# Patient Record
Sex: FEMALE | Race: WHITE
[De-identification: names, ages, dates, MRNs, and addresses within clinical notes are randomized per-mention and may not be internally consistent; named-entity substitution may affect disease eponyms.]

---

## 2022-12-04 ENCOUNTER — HOSPITAL ENCOUNTER (OUTPATIENT)
Dept: HOSPITAL 97 - ER | Age: 77
Setting detail: OBSERVATION
LOS: 1 days | Discharge: HOME | End: 2022-12-05
Attending: HOSPITALIST | Admitting: HOSPITALIST
Payer: COMMERCIAL

## 2022-12-04 VITALS — OXYGEN SATURATION: 98 %

## 2022-12-04 VITALS — BODY MASS INDEX: 30.9 KG/M2

## 2022-12-04 DIAGNOSIS — G45.4: Primary | ICD-10-CM

## 2022-12-04 DIAGNOSIS — Z20.822: ICD-10-CM

## 2022-12-04 DIAGNOSIS — R42: ICD-10-CM

## 2022-12-04 DIAGNOSIS — F13.20: ICD-10-CM

## 2022-12-04 LAB
ALBUMIN SERPL BCP-MCNC: 3.6 G/DL (ref 3.4–5)
ALP SERPL-CCNC: 77 U/L (ref 45–117)
ALT SERPL W P-5'-P-CCNC: 40 U/L (ref 12–78)
AST SERPL W P-5'-P-CCNC: 21 U/L (ref 15–37)
BUN BLD-MCNC: 14 MG/DL (ref 7–18)
GLUCOSE SERPLBLD-MCNC: 174 MG/DL (ref 74–106)
HCT VFR BLD CALC: 40.4 % (ref 36–45)
INR BLD: 0.93
LYMPHOCYTES # SPEC AUTO: 1.8 K/UL (ref 0.7–4.9)
MAGNESIUM SERPL-MCNC: 1.6 MG/DL (ref 1.8–2.4)
MCV RBC: 91.3 FL (ref 80–100)
NT-PROBNP SERPL-MCNC: 42 PG/ML (ref ?–450)
PMV BLD: 7.3 FL (ref 7.6–11.3)
POTASSIUM SERPL-SCNC: 3.3 MMOL/L (ref 3.5–5.1)
RBC # BLD: 4.42 M/UL (ref 3.86–4.86)
TROPONIN I SERPL HS-MCNC: 8.4 PG/ML (ref ?–58.9)

## 2022-12-04 PROCEDURE — 81003 URINALYSIS AUTO W/O SCOPE: CPT

## 2022-12-04 PROCEDURE — 85730 THROMBOPLASTIN TIME PARTIAL: CPT

## 2022-12-04 PROCEDURE — 80053 COMPREHEN METABOLIC PANEL: CPT

## 2022-12-04 PROCEDURE — 85610 PROTHROMBIN TIME: CPT

## 2022-12-04 PROCEDURE — 85652 RBC SED RATE AUTOMATED: CPT

## 2022-12-04 PROCEDURE — 84484 ASSAY OF TROPONIN QUANT: CPT

## 2022-12-04 PROCEDURE — 83690 ASSAY OF LIPASE: CPT

## 2022-12-04 PROCEDURE — 96365 THER/PROPH/DIAG IV INF INIT: CPT

## 2022-12-04 PROCEDURE — 85025 COMPLETE CBC W/AUTO DIFF WBC: CPT

## 2022-12-04 PROCEDURE — 83735 ASSAY OF MAGNESIUM: CPT

## 2022-12-04 PROCEDURE — 87811 SARS-COV-2 COVID19 W/OPTIC: CPT

## 2022-12-04 PROCEDURE — 70450 CT HEAD/BRAIN W/O DYE: CPT

## 2022-12-04 PROCEDURE — 36415 COLL VENOUS BLD VENIPUNCTURE: CPT

## 2022-12-04 PROCEDURE — 83880 ASSAY OF NATRIURETIC PEPTIDE: CPT

## 2022-12-04 PROCEDURE — 82565 ASSAY OF CREATININE: CPT

## 2022-12-04 PROCEDURE — 99285 EMERGENCY DEPT VISIT HI MDM: CPT

## 2022-12-04 PROCEDURE — 80061 LIPID PANEL: CPT

## 2022-12-04 PROCEDURE — 70496 CT ANGIOGRAPHY HEAD: CPT

## 2022-12-04 PROCEDURE — 93880 EXTRACRANIAL BILAT STUDY: CPT

## 2022-12-04 PROCEDURE — 70498 CT ANGIOGRAPHY NECK: CPT

## 2022-12-04 PROCEDURE — 80048 BASIC METABOLIC PNL TOTAL CA: CPT

## 2022-12-04 PROCEDURE — 70551 MRI BRAIN STEM W/O DYE: CPT

## 2022-12-04 PROCEDURE — 86140 C-REACTIVE PROTEIN: CPT

## 2022-12-04 PROCEDURE — 96375 TX/PRO/DX INJ NEW DRUG ADDON: CPT

## 2022-12-04 PROCEDURE — 71045 X-RAY EXAM CHEST 1 VIEW: CPT

## 2022-12-04 PROCEDURE — 93306 TTE W/DOPPLER COMPLETE: CPT

## 2022-12-04 PROCEDURE — 93005 ELECTROCARDIOGRAM TRACING: CPT

## 2022-12-04 PROCEDURE — 80076 HEPATIC FUNCTION PANEL: CPT

## 2022-12-04 RX ADMIN — AMLODIPINE BESYLATE SCH MG: 2.5 TABLET ORAL at 22:07

## 2022-12-04 RX ADMIN — LOSARTAN POTASSIUM SCH MG: 50 TABLET, FILM COATED ORAL at 22:07

## 2022-12-04 RX ADMIN — Medication SCH ML: at 21:00

## 2022-12-04 NOTE — EDPHYS
Physician Documentation                                                                           

 [facility]                                                                 

Name: Monika Domingo                                                                               

Age: 77 yrs                                                                                       

Sex: Female                                                                                       

: 1945                                                                                   

MRN: [id number]                                                                                   

Arrival Date: 2022                                                                          

Time: 09:41                                                                                       

Account#: [id number]                                                                            

Bed 3                                                                                             

Private MD: Pierce Rayo T                                                                        

ED Physician Malcolm Mariscal                                                                      

HPI:                                                                                              

                                                                                             

12:27 This 77 yrs old Unknown Female presents to ER via Ambulatory with complaints of         ernie 

      Dizziness, High Blood Pressure.                                                             

12:27 The patient presents with dizziness, generalized weakness, lightheadedness, sense of    ernie 

      spinning. Onset: The symptoms/episode began/occurred 3 day(s) ago. Context: occurred.       

      Modifying factors: The symptoms are alleviated by nothing, the symptoms are aggravated      

      by standing up. Associated signs and symptoms: Pertinent positives: confusion. Severity     

      of symptoms: At their worst the symptoms were mild in the emergency department the          

      symptoms are unchanged. Patient's baseline: Neuro: alert and fully oriented. The            

      patient has experienced similar episodes in the past, a few times.                          

                                                                                                  

Historical:                                                                                       

- Allergies:                                                                                      

10:13 No Known Allergies;                                                                     kb3 

- PMHx:                                                                                           

10:13 Hypercholesterolemia; Hypertensive disorder; NIDDM; Bladder spasms; COPD;               kb3 

- PSHx:                                                                                           

10:13 Total abdominal hysterectomy; Cholecystectomy; Bilateral Knee Sx;                       kb3 

                                                                                                  

- Immunization history:: Adult Immunizations up to date, Client reports having NOT                

  received the Covid vaccine. Last tetanus immunization: unknown.                                 

- Social history:: Smoking status: Patient reports the use of cigarette tobacco                   

  products, denies chronic smoking, but will smoke occasionally.                                  

- Family history:: not pertinent.                                                                 

                                                                                                  

                                                                                                  

ROS:                                                                                              

12:27 Constitutional: Negative for fever, chills, and weight loss, Eyes: Negative for injury, ernie 

      pain, redness, and discharge, ENT: Negative for injury, pain, and discharge, Neck:          

      Negative for injury, pain, and swelling, Cardiovascular: Negative for chest pain,           

      palpitations, and edema, Respiratory: Negative for shortness of breath, cough,              

      wheezing, and pleuritic chest pain, Abdomen/GI: Negative for abdominal pain, nausea,        

      vomiting, diarrhea, and constipation, Back: Negative for injury and pain, : Negative      

      for injury, bleeding, discharge, and swelling, MS/Extremity: Negative for injury and        

      deformity, Skin: Negative for injury, rash, and discoloration, Psych: Negative for          

      depression, anxiety, suicide ideation, homicidal ideation, and hallucinations,              

      Allergy/Immunology: Negative for hives, rash, and allergies, Endocrine: Negative for        

      neck swelling, polydipsia, polyuria, polyphagia, and marked weight changes,                 

      Hematologic/Lymphatic: Negative for swollen nodes, abnormal bleeding, and unusual           

      bruising.                                                                                   

12:27 Neuro: Positive for altered mental status, dizziness, weakness.                             

                                                                                                  

Exam:                                                                                             

12:27 Constitutional:  This is a well developed, well nourished patient who is awake, alert,  ernie 

      and in no acute distress. Head/Face:  Normocephalic, atraumatic. Eyes:  Pupils equal        

      round and reactive to light, extra-ocular motions intact.  Lids and lashes normal.          

      Conjunctiva and sclera are non-icteric and not injected.  Cornea within normal limits.      

      Periorbital areas with no swelling, redness, or edema. ENT:  Nares patent. No nasal         

      discharge, no septal abnormalities noted.  Tympanic membranes are normal and external       

      auditory canals are clear.  Oropharynx with no redness, swelling, or masses, exudates,      

      or evidence of obstruction, uvula midline.  Mucous membranes moist. Neck:  Trachea          

      midline, no thyromegaly or masses palpated, and no cervical lymphadenopathy.  Supple,       

      full range of motion without nuchal rigidity, or vertebral point tenderness.  No            

      Meningismus. Chest/axilla:  Normal chest wall appearance and motion.  Nontender with no     

      deformity.  No lesions are appreciated. Cardiovascular:  Regular rate and rhythm with a     

      normal S1 and S2.  No gallops, murmurs, or rubs.  Normal PMI, no JVD.  No pulse             

      deficits. Respiratory:  Lungs have equal breath sounds bilaterally, clear to                

      auscultation and percussion.  No rales, rhonchi or wheezes noted.  No increased work of     

      breathing, no retractions or nasal flaring. Abdomen/GI:  Soft, non-tender, with normal      

      bowel sounds.  No distension or tympany.  No guarding or rebound.  No evidence of           

      tenderness throughout. Back:  No spinal tenderness.  No costovertebral tenderness.          

      Full range of motion. Female :  Normal external genitalia. Skin:  Warm, dry with          

      normal turgor.  Normal color with no rashes, no lesions, and no evidence of cellulitis.     

      MS/ Extremity:  Pulses equal, no cyanosis.  Neurovascular intact.  Full, normal range       

      of motion. Neuro:  Awake and alert, GCS 15, oriented to person, place, time, and            

      situation.  Cranial nerves II-XII grossly intact.  Motor strength 5/5 in all                

      extremities.  Sensory grossly intact.  Cerebellar exam normal.  Normal gait. Psych:         

      Awake, alert, with orientation to person, place and time.  Behavior, mood, and affect       

      are within normal limits.                                                                   

12:27 ECG was reviewed by the Attending Physician.                                                

                                                                                                  

Vital Signs:                                                                                      

10:07  / 110; Pulse 95; Resp 20; Temp 97.6; Pulse Ox 97% ; Weight 81.65 kg; Height 5    kb3 

      ft. 4 in. (162.56 cm); Pain 5/10;                                                           

10:43  / 95; Pulse 76; Resp 18; Pulse Ox 97% on R/A;                                    ld1 

11:54  / 86; Pulse 70; Resp 16; Pulse Ox 100% on R/A;                                   ph  

13:00  / 78; Pulse 72; Resp 18; Pulse Ox 98% on R/A;                                    ph  

14:00  / 89; Pulse 71; Resp 18; Pulse Ox 98% on R/A;                                    ph  

15:00  / 90; Pulse 72; Resp 16; Pulse Ox 99% ;                                          ph  

16:00  / 82; Pulse 71; Resp 18; Pulse Ox 98% on R/A;                                    ph  

19:00  / 81; Pulse 68; Resp 16; Pulse Ox 99% on R/A;                                    eh3 

20:00  / 80; Pulse 67; Resp 16; Pulse Ox 98% on R/A;                                    eh3 

10:07 Body Mass Index 30.90 (81.65 kg, 162.56 cm)                                             kb3 

                                                                                                  

MDM:                                                                                              

10:08 Patient medically screened.                                                             ernie 

12:29 Differential diagnosis: cardiac arrhythmia, CVA, generalized weakness, hypovolemia,     ernie 

      idiopathic dizziness, near-syncope, TIA, vertigo. Data reviewed: vital signs, nurses        

      notes, lab test result(s), EKG, radiologic studies, CT scan, plain films. Data              

      interpreted: Cardiac monitor: rate is 70 beats/min, rhythm is regular, Pulse oximetry:      

      on room air is 100 %. Test interpretation: by ED physician or midlevel provider: ECG,       

      plain radiologic studies. Counseling: I had a detailed discussion with the patient          

      and/or guardian regarding: the historical points, exam findings, and any diagnostic         

      results supporting the discharge/admit diagnosis, lab results, radiology results, the       

      need for further work-up and treatment in the hospital.                                     

12:33 ED course: FAMILY WOULD NOT RECEIVE TNK UNDER THESE FINDINGS.                           ernie 

                                                                                                  

                                                                                             

10:08 Order name: Basic Metabolic Panel; Complete Time: 12:08                                 ernie 

                                                                                             

10:08 Order name: CBC with Diff; Complete Time: 12:08                                         ernie 

                                                                                             

10:08 Order name: LFT's; Complete Time: 12:08                                                 ernie 

                                                                                             

10:08 Order name: Magnesium; Complete Time: 12:08                                             ernie 

                                                                                             

10:08 Order name: NT PRO-BNP; Complete Time: 12:08                                            ernie 

                                                                                             

10:08 Order name: PT-INR; Complete Time: 12:08                                                ernie 

                                                                                             

10:08 Order name: Troponin HS; Complete Time: 12:08                                           ernie 

                                                                                             

10:14 Order name: SARS RAPID; Complete Time: 12:08                                            ernie 

                                                                                             

10:14 Order name: Sed Rate; Complete Time: 12:08                                              ernie 

                                                                                             

10:14 Order name: CRP; Complete Time: 12:08                                                   ernie 

                                                                                             

10:35 Order name: Urine Dipstick-Ancillary; Complete Time: 12:08                              EDMS

                                                                                             

12:33 Order name: Lipase                                                                      [facility] 

                                                                                             

14:05 Order name: CBC with Automated Diff                                                     EDMS

                                                                                             

14:05 Order name: CBC with Automated Diff                                                     EDMS

                                                                                             

14:05 Order name: Comprehensive Metabolic Panel                                               EDMS

                                                                                             

14:05 Order name: Comprehensive Metabolic Panel                                               EDMS

                                                                                             

14:05 Order name: Lipid Profile                                                               EDMS

                                                                                             

14:05 Order name: Lipid Profile                                                               EDMS

                                                                                             

14:05 Order name: Magnesium                                                                   EDMS

                                                                                             

14:05 Order name: Magnesium                                                                   EDMS

                                                                                             

14:05 Order name: Protime (+INR)                                                              EDMS

                                                                                             

14:05 Order name: Protime (+INR)                                                              EDMS

                                                                                             

14:05 Order name: Protime (+INR)                                                              EDMS

                                                                                             

14:05 Order name: Protime (+INR)                                                              EDMS

                                                                                             

14:05 Order name: Protime (+INR)                                                              EDMS

                                                                                             

14:05 Order name: Protime (+INR)                                                              EDMS

                                                                                             

14:05 Order name: PTT, Activated Partial Thromb                                               EDMS

                                                                                             

14:05 Order name: PTT, Activated Partial Thromb                                               EDMS

                                                                                             

14:05 Order name: PTT, Activated Partial Thromb                                               EDMS

                                                                                             

14:05 Order name: PTT, Activated Partial Thromb                                               EDMS

                                                                                             

10:08 Order name: XRAY Chest (1 view); Complete Time: 12:26                                   [facility] 

                                                                                             

10:08 Order name: EKG; Complete Time: 10:09                                                   [facility] 

                                                                                             

10:08 Order name: Cardiac monitoring; Complete Time: 10:41                                    [facility] 

                                                                                             

10:08 Order name: EKG - Nurse/Tech; Complete Time: 10:41                                      [facility] 

                                                                                             

10:08 Order name: IV Saline Lock; Complete Time: 10:41                                        [facility] 

                                                                                             

10:08 Order name: Labs collected and sent; Complete Time: 10:41                               [facility] 

                                                                                             

10:08 Order name: O2 Per Protocol; Complete Time: 10:41                                       [facility] 

                                                                                             

10:08 Order name: O2 Sat Monitoring; Complete Time: 10:41                                     [facility] 

                                                                                             

10:08 Order name: Urine Dipstick-Ancillary (obtain specimen); Complete Time: 10:41            [facility] 

                                                                                             

10:14 Order name: CT Stroke Brain w/o Contrast; Complete Time: 12:08                          [facility] 

                                                                                             

10:14 Order name: CT Head Angio; Complete Time: 12:08                                         [facility] 

                                                                                             

10:14 Order name: CT Neck Angio                                                               [facility] 

                                                                                             

10:21 Order name: Neck Angio; Complete Time: 12:08                                            EDMS

                                                                                             

14:05 Order name: Physical Therapy Consult                                                    EDMS

                                                                                             

14:05 Order name: Echo with Doppler                                                           EDMS

                                                                                             

14:05 Order name: EKG Electrocardiogram                                                       EDMS

                                                                                             

14:05 Order name: PTT, Activated Partial Thromb                                               EDMS

                                                                                             

14:05 Order name: PTT, Activated Partial Thromb                                               EDMS

                                                                                             

14:05 Order name: Brain Wo Cont                                                               EDMS

                                                                                             

14:05 Order name: Carotid Artery Bilateral                                                    EDMS

                                                                                             

15:11 Order name: Misc. Order: restroom assistance; Complete Time: 19:48                      jmm 

                                                                                                  

EC:27 Rate is 76 beats/min. Rhythm is regular. QRS Axis is Normal. PA interval is normal. QRS ernie 

      interval is normal. QT interval is normal. No Q waves. T waves are Normal. No ST            

      changes noted. Clinical impression: NSR w/ Non-specific ST/T Changes and No evidence of     

      ischemia. Interpreted by me. Reviewed by me.                                                

                                                                                                  

Administered Medications:                                                                         

10:50 Drug: foLIC Acid 1 mg Route: IVPB; Site: right antecubital;                             ld1 

11:00 Follow up: Response: No adverse reaction; IV Status: Completed infusion                 ph  

11:36 Drug: Aspirin Chewable Tablet 162 mg Route: PO;                                         ld1 

20:01 Follow up: Response: No adverse reaction                                                ph  

13:37 Drug: Potassium Effervescent Tablet 25 mEq Route: PO;                                   ph  

20:01 Follow up: Response: No adverse reaction                                                ph  

13:37 Drug: Magnesium Sulfate 1 grams Route: IVPB; Infused Over: 1 hrs; Site: right           ph  

      antecubital;                                                                                

14:40 Follow up: Response: No adverse reaction; IV Status: Completed infusion                 ph  

13:37 Drug: Aspirin Chewable Tablet 162 mg Route: PO;                                         ph  

20:00 Follow up: Response: No adverse reaction                                                ph  

                                                                                                  

                                                                                                  

Disposition Summary:                                                                              

22 12:31                                                                                    

Hospitalization Ordered                                                                           

      Hospitalization Status: Observation                                                     ernie 

      Provider: Darinel Bills cha 

      Location: Telemetry/[facility] (observation)                                               ernie 

      Condition: Stable                                                                       ernie 

      Problem: new                                                                            ernie 

      Symptoms: have improved                                                                 ernie 

      Bed/Room Type: Standard                                                                 ernie 

      Room Assignment: 408(22 20:06)                                                    mw  

      Diagnosis                                                                                   

        - Altered mental status, unspecified                                                  ernie 

        - Dizziness and giddiness                                                             ernie 

        - Essential (primary) hypertension                                                    ernie 

        - Hypomagnesemia                                                                      ernie 

        - Hypokalemia                                                                         ernie 

        - Tobacco abuse counseling                                                            ernie 

        - Tobacco use                                                                         ernie 

      Forms:                                                                                      

        - Medication Reconciliation Form                                                      ernie 

        - SBAR form                                                                           ernie 

Signatures:                                                                                       

Dispatcher [employer]                           Mamta Sweet RN                        RN   Malcolm Manzo MD MD cha Mickail, Joel, PA PA jmm Hall, Patricia, RN                      RN                                                      

Emerita Mckeon RN                     RN   ld1                                                  

Peyton Murdock RN                    RN   kb3                                                  

                                                                                                  

Corrections: (The following items were deleted from the chart)                                    

20:06 12:31 ernie                                                                               mw  

                                                                                                  

**************************************************************************************************

## 2022-12-04 NOTE — RAD REPORT
EXAM DESCRIPTION:  RAD - Chest Single View - 12/4/2022 11:44 am

 

CLINICAL HISTORY:  COUGH, code stroke chest film

 

COMPARISON:  None

 

TECHNIQUE:  AP portable chest image was obtained 12/4/2022 11:44 am .

 

FINDINGS:  Lungs are clear. Heart and vasculature are normal. No measurable pleural effusion and no p
neumothorax. Hazy appearance to the hemidiaphragm and left costophrenic angle believed to be portable
 exam artifact. No acute bony abnormality seen. No acute aortic findings suspected.

 

IMPRESSION:  No acute cardiopulmonary process.

## 2022-12-04 NOTE — RAD REPORT
EXAM DESCRIPTION:  CT - Head angio - 12/4/2022 11:07 am

 

CLINICAL HISTORY:  Neuro deficit, acute, stroke suspected

 

TECHNIQUE:  During dynamic enhancement using nonionic IV contrast, axial 1 millimeter thick images of
 the head were obtained. Sagittal and axial reconstruction images were generated using MIP technique 
and reviewed.

 

All CT scans are performed using dose optimization technique as appropriate and may include automated
 exposure control or mA/KV adjustment according to patient size.

 

COMPARISON:   CT head same date, CT angio neck same date

 

FINDINGS:   No aneurysm or vascular malformation identified.

 

Major venous sinuses are patent.

 

No stenosis, named branch occlusion, vasculitis or other significant vascular finding identifiable. C
arotid atherosclerotic changes are present in the cavernous sinuses. No measurable luminal narrowing.


 

 

IMPRESSION:   Negative CT angio head examination for acute or significant finding.

## 2022-12-04 NOTE — RAD REPORT
EXAM DESCRIPTION:  CT - Ct Stroke Brain Wo Cont - 12/4/2022 10:23 am

 

CLINICAL HISTORY:  Neuro deficit, acute, stroke suspected, Stroke protocol examination

 

COMPARISON:  <Comparisons>none

 

TECHNIQUE:  Axial 5 millimeter thick images of the head were obtained without IV contrast.

 

All CT scans are performed using dose optimization technique as appropriate and may include automated
 exposure control or mA/KV adjustment according to patient size.

 

FINDINGS:  No intracranial hemorrhage, mass, or cerebral edema. No acute cortical based infarction se
en. No cortical edema or sulcal effacement. Patient has atrophy changes are minimal. Ventricles are n
ormal for the amount of volume loss. Chronic ischemic change seen in the cerebral white matter. No ex
tra-axial fluid collections.  Gray matter-white matter differentiation is preserved.Dense arterial tr
ee calcifications are present.

 

No globe or orbital content abnormality.

 

 

Visualized portions of the mastoid air cells, paranasal sinuses, and orbits are unremarkable.

 

Findings telephoned to Dr Mariscal 10:34 a.m..

 

IMPRESSION:  No hemorrhage. No acute CVA identifiable.

 

Patient has minimal atrophy and mild chronic ischemic change.

## 2022-12-04 NOTE — ER
Nurse's Notes                                                                                     

 [facility]                                                                 

Name: Monika Domingo                                                                               

Age: 77 yrs                                                                                       

Sex: Female                                                                                       

: 1945                                                                                   

MRN: [id number]                                                                                   

Arrival Date: 2022                                                                          

Time: 09:41                                                                                       

Account#: [id number]                                                                            

Bed 3                                                                                             

Private MD: Pierce Rayo T                                                                        

Diagnosis: Altered mental status, unspecified;Dizziness and giddiness;Essential (primary)         

  hypertension;Hypomagnesemia;Hypokalemia;Tobacco abuse counseling;Tobacco use                    

                                                                                                  

Presentation:                                                                                     

                                                                                             

10:07 Chief complaint: Patient states: Pt reports feeling "off' upon waking and slightly      kb3 

      dizzy. As she was driving to [employer], a  pulled her over and stated that       

      she was driving erratically. Pt's daughter was called to scene and pt reported feeling      

      dizzy with right posterior neck pain, no headache at this time but onset of                 

      intermittent headaches 1 week ago. Coronavirus screen: Vaccine status: Patient reports      

      being unvaccinated. Client denies travel out of the [place] in the last 14 days. Ebola         

      Screen: Patient negative for fever greater than or equal to 101.5 degrees Fahrenheit,       

      and additional compatible Ebola Virus Disease symptoms Patient denies exposure to           

      infectious person. Patient denies travel to an Ebola-affected area in the 21 days           

      before illness onset. Initial Sepsis Screen: Does the patient meet any 2 criteria? No.      

      Patient's initial sepsis screen is negative. Does the patient have a suspected source       

      of infection? No. Patient's initial sepsis screen is negative. Risk Assessment: Do you      

      want to hurt yourself or someone else? Patient reports no desire to harm self or            

      others. Onset of symptoms was 2022 at 07:00.                                   

10:07 Method Of Arrival: Ambulatory                                                           kb3 

10:07 Acuity: JEVON 2                                                                           kb3 

                                                                                                  

Triage Assessment:                                                                                

10:11 General: Appears in no apparent distress. Behavior is calm, cooperative, Code Stroke    kb3 

      activated.                                                                                  

10:11 Pain: Complains of pain in back of neck Pain does not radiate. Pain currently is 5 out  kb3 

      of 10 on a pain scale. Quality of pain is described as aching, Pain began 3 hours ago.      

                                                                                                  

Historical:                                                                                       

- Allergies:                                                                                      

10:13 No Known Allergies;                                                                     kb3 

- PMHx:                                                                                           

10:13 Hypercholesterolemia; Hypertensive disorder; NIDDM; Bladder spasms; COPD;               kb3 

- PSHx:                                                                                           

10:13 Total abdominal hysterectomy; Cholecystectomy; Bilateral Knee Sx;                       kb3 

                                                                                                  

- Immunization history:: Adult Immunizations up to date, Client reports having NOT                

  received the Covid vaccine. Last tetanus immunization: unknown.                                 

- Social history:: Smoking status: Patient reports the use of cigarette tobacco                   

  products, denies chronic smoking, but will smoke occasionally.                                  

- Family history:: not pertinent.                                                                 

                                                                                                  

                                                                                                  

Screenin:54 Abuse screen: Denies threats or abuse. Denies injuries from another. Nutritional        ph  

      screening: No deficits noted. Tuberculosis screening: No symptoms or risk factors           

      identified. Fall Risk None identified.                                                      

                                                                                                  

Assessment:                                                                                       

10:11 Reassessment: Code stroke called overhead.                                              ph  

10:30 General: Appears in no apparent distress. comfortable, well groomed, Behavior is calm,  ph  

      cooperative, appropriate for age, Denies fever, feeling ill. Pain: Denies pain. Neuro:      

      Level of Consciousness is awake, alert, obeys commands, Oriented to person, place,          

      time, situation,  are equal bilaterally Moves all extremities. Full function Gait      

      is steady, Speech is normal, Facial symmetry appears normal, Facial symmetry: tongue is     

      midline, Pupils are PERRLA, Intact Reports dizziness, since waking this morning.            

      Cardiovascular: Denies chest pain, Capillary refill < 3 seconds in bilateral fingers        

      Patient's skin is warm and dry. Respiratory: Airway is patent Respiratory effort is         

      even, unlabored. GI: No signs and/or symptoms were reported involving the                   

      gastrointestinal system. Derm: Skin is healthy with good turgor, Skin is pink, warm \T\     

      dry. Musculoskeletal: Circulation, motion, and sensation intact. Range of motion:           

      intact in all extremities.                                                                  

11:55 Reassessment: Patient appears in no apparent distress at this time. Patient and/or      ph  

      family updated on plan of care and expected duration. Pain level reassessed. Patient is     

      alert, oriented x 3, equal unlabored respirations, skin warm/dry/pink.                      

13:00 Reassessment: Patient appears in no apparent distress at this time. Patient and/or      ph  

      family updated on plan of care and expected duration. Pain level reassessed. Patient is     

      alert, oriented x 3, equal unlabored respirations, skin warm/dry/pink.                      

14:00 Reassessment: Patient appears in no apparent distress at this time. Patient and/or      ph  

      family updated on plan of care and expected duration. Pain level reassessed. Patient is     

      alert, oriented x 3, equal unlabored respirations, skin warm/dry/pink.                      

15:00 Reassessment: Patient appears in no apparent distress at this time. Patient and/or      ph  

      family updated on plan of care and expected duration. Pain level reassessed. Patient is     

      alert, oriented x 3, equal unlabored respirations, skin warm/dry/pink.                      

16:00 Reassessment: Patient appears in no apparent distress at this time. Patient and/or      ph  

      family updated on plan of care and expected duration. Pain level reassessed. Patient is     

      alert, oriented x 3, equal unlabored respirations, skin warm/dry/pink.                      

17:00 Reassessment: Patient appears in no apparent distress at this time. Patient and/or      ph  

      family updated on plan of care and expected duration. Pain level reassessed. Patient is     

      alert, oriented x 3, equal unlabored respirations, skin warm/dry/pink.                      

19:00 General: Appears in no apparent distress. comfortable, Behavior is calm, cooperative,   eh3 

      appropriate for age. Pain: Denies pain. Neuro: Level of Consciousness is awake, alert,      

      obeys commands, Oriented to person, place, time, situation. Cardiovascular: Capillary       

      refill < 3 seconds Patient's skin is warm and dry. Respiratory: Airway is patent            

      Respiratory effort is even, unlabored, Respiratory pattern is regular, symmetrical. GI:     

      No signs and/or symptoms were reported involving the gastrointestinal system. : No        

      signs and/or symptoms were reported regarding the genitourinary system. EENT: No signs      

      and/or symptoms were reported regarding the EENT system. Derm: No signs and/or symptoms     

      reported regarding the dermatologic system. Musculoskeletal: No signs and/or symptoms       

      reported regarding the musculoskeletal system.                                              

20:00 Reassessment: Patient appears in no apparent distress at this time. Patient and/or      eh3 

      family updated on plan of care and expected duration. Pain level reassessed. Patient is     

      alert, oriented x 3, equal unlabored respirations, skin warm/dry/pink.                      

20:15 Reassessment: Attempted to call report to 4th floor, nurse was busy and will call back. [facility] 

                                                                                                  

Vital Signs:                                                                                      

10:07  / 110; Pulse 95; Resp 20; Temp 97.6; Pulse Ox 97% ; Weight 81.65 kg; Height 5    kb3 

      ft. 4 in. (162.56 cm); Pain 5/10;                                                           

10:43  / 95; Pulse 76; Resp 18; Pulse Ox 97% on R/A;                                    ld1 

11:54  / 86; Pulse 70; Resp 16; Pulse Ox 100% on R/A;                                   ph  

13:00  / 78; Pulse 72; Resp 18; Pulse Ox 98% on R/A;                                    ph  

14:00  / 89; Pulse 71; Resp 18; Pulse Ox 98% on R/A;                                    ph  

15:00  / 90; Pulse 72; Resp 16; Pulse Ox 99% ;                                          ph  

16:00  / 82; Pulse 71; Resp 18; Pulse Ox 98% on R/A;                                    ph  

19:00  / 81; Pulse 68; Resp 16; Pulse Ox 99% on R/A;                                    eh3 

20:00  / 80; Pulse 67; Resp 16; Pulse Ox 98% on R/A;                                    eh3 

10:07 Body Mass Index 30.90 (81.65 kg, 162.56 cm)                                             kb3 

                                                                                                  

ED Course:                                                                                        

09:41 Patient arrived in ED.                                                                  rg4 

09:41 Pierce Rayo MD is Private Physician.                                                   rg4 

10:07 Malcolm Mariscal MD is Attending Physician.                                             ernie 

10:09 Emerita Mckeon, MADAY is Primary Nurse.                                                   ld1 

10:11 Arm band placed on right wrist. Patient placed in an exam room, on a stretcher.         kb3 

10:13 Triage completed.                                                                       kb3 

10:25 CT Stroke Brain w/o Contrast In Process Unspecified.                                    EDMS

10:40 SARS RAPID Sent.                                                                        ld1 

10:50 No provider procedures requiring assistance completed. Inserted saline lock: 20 gauge   ld1 

      in right antecubital area, using aseptic technique. Blood collected.                        

11:09 CT Head Angio In Process Unspecified.                                                   EDMS

11:09 Neck Angio In Process Unspecified.                                                      EDMS

11:46 XRAY Chest (1 view) In Process Unspecified.                                             EDMS

11:54 Patient has correct armband on for positive identification. Placed in gown. Bed in low  ph  

      position. Call light in reach. Side rails up X 1. Client placed on continuous cardiac       

      and pulse oximetry monitoring. NIBP monitoring applied.                                     

12:30 Darinel Bills MD is Hospitalizing Provider.                                           ernie 

13:37 Patient admitted, IV remains in place.                                                  ph  

19:27 Primary Nurse role handed off by Emerita Mckeon RN                                      

19:46 Karen Canales, RN is Primary Nurse.                                                        eh3 

                                                                                                  

Administered Medications:                                                                         

10:50 Drug: foLIC Acid 1 mg Route: IVPB; Site: right antecubital;                             ld1 

11:00 Follow up: Response: No adverse reaction; IV Status: Completed infusion                 ph  

11:36 Drug: Aspirin Chewable Tablet 162 mg Route: PO;                                         ld1 

20:01 Follow up: Response: No adverse reaction                                                ph  

13:37 Drug: Potassium Effervescent Tablet 25 mEq Route: PO;                                   ph  

20:01 Follow up: Response: No adverse reaction                                                ph  

13:37 Drug: Magnesium Sulfate 1 grams Route: IVPB; Infused Over: 1 hrs; Site: right           ph  

      antecubital;                                                                                

14:40 Follow up: Response: No adverse reaction; IV Status: Completed infusion                 ph  

13:37 Drug: Aspirin Chewable Tablet 162 mg Route: PO;                                         ph  

20:00 Follow up: Response: No adverse reaction                                                ph  

                                                                                                  

                                                                                                  

Medication:                                                                                       

11:54 VIS not applicable for this client.                                                     ph  

                                                                                                  

Outcome:                                                                                          

12:31 Decision to Hospitalize by Provider.                                                    ernie 

20:46 Admitted to Med/surg accompanied by nurse, via wheelchair, room 408, with chart, Report 3 

      called to  David                                                                           

20:46 Condition: stable                                                                           

20:46 Instructed on the need for admit.                                                           

20:46 Patient left the ED.                                                                    3 

                                                                                                  

Signatures:                                                                                       

Dispatcher [employer]                           EDMalcolm Lo MD MD cha Hall, Patricia, RN                      RN   Betzy Landaverde                                 rg4                                                  

Luh Haq                                                                              

Emerita Mckeon, RN                     RN   ld1                                                  

Karen Canales, MADAY NASASR   3                                                  

Peyton Murdock RN                    RN   kb3                                                  

                                                                                                  

**************************************************************************************************

## 2022-12-04 NOTE — XMS REPORT
Continuity of Care Document

                           Created on:2022



Patient:FRANTZ SALMON

Sex:Female

:1945

External Reference #:[id number]





Demographics







                          Address                   [address]



                                                    [place] [address]

 

                          Home Phone                [phone]

 

                          Work Phone                [phone]

 

                          Email Address             [email or web address]

 

                          Preferred Language        English

 

                          Marital Status            Unknown

 

                          Pentecostal Affiliation     Unknown

 

                          Race                      Unknown

 

                          Additional Race(s)        Unavailable



                                                    White

 

                          Ethnic Group              Not  or 









Author







                          Organization              [facility]

t

 

                          Address                   [address]



                                                    [place] [address]

 

                          Phone                     [phone]









Support







                Name            Relationship    Address         Phone

 

                CRUMRINE, CLARY Unavailable     [address]  [phone]



                                                [place] [address] 

 

                CRUMRINE, CLARY Unavailable     [address]  [phone]



                                                [place] [address] 

 

                CRUMRINE, CLARY DA              [address]  [phone]



                                                [place] [id number] 









Care Team Providers







                    Name                Role                Phone

 

                    Dilip Yap   Attending Clinician Unavailable

 

                    Herve  Attending Clinician Unavailable

 

                    Chiquita Singh Attending Clinician Unavailable

 

                    Pablo Carroll  Attending Clinician Unavailable

 

                    UNDEFINED           Admitting Clinician Unavailable

 

                    Physician, No Primary or Family Admitting Clinician Unavaila

ble

 

                    Herve  Admitting Clinician Unavailable

 

                    Chiquita Singh Admitting Clinician Unavailable

 

                    Dilip Yap   Admitting Clinician Unavailable

 

                    Pablo Carroll  Admitting Clinician Unavailable









Payers







           Payer Name Policy Type Policy Number Effective Date Expiration Date [employer] (MEDICARE            [id number] 2022            



           REPLACEMENT PPO)                       00:00:00              







Problems







       Condition Condition Condition Status Onset  Resolution Last   Treating Co

mments 

Source



       Name   Details Category        Date   Date   Treatment Clinician        



                                                 Date                 

 

       Body mass Body Mass Problem Active 2018                             [facility]

via



       index 30+ Index 30+               0-03                               Medi

loren



       - obesity - Obesity               00:00:                             



                                   00                                 







Allergies, Adverse Reactions, Alerts







       Allergy Allergy Status Severity Reaction(s) Onset  Inactive Treating Comm

ents 

Source



       Name   Type                        Date   Date   Clinician        

 

       No Known DA     Active U             2021                      [facility]



       Allergie                             0-18                        [place]



       s                                  00:00:                      d



                                          00                          [facility]

 

       No Known DA     Active U                                   [facility]



       Allergie                             7-12                        [place]



       s                                  00:00:                      [place]



                                          00                          [facility]

 

       No Known DA     Active U                                   [facility]



       Allergie                                                     Clear



       s                                  00:00:                      [place]



                                          00                          [facility]

 

       No Known DA     Active U             2014                      [facility]



       Allergie                             0-13                        [place]



       s                                  00:00:                      d



                                          00                          Medical



                                                                      Center







Social History







                Smoking Status  Start Date      Stop Date       Source

 

                Never Smoker                                    [facility]







Medications







       Ordered Filled Start  Stop   Current Ordering Indication Dosage Frequency

 Signature

                    Comments            Components          Source



     Medication Medication Date Date Medication? Clinician                (SIG) 

          



     Name Name                                                   

 

     amlodipine amlodipine           No                       amlodipine        

   [facility]



     2.5 mg 2.5 mg                                    2.5 mg           Medical



     tablet TAKE tablet TAKE                                    tablet          

 



     1 TABLET BY 1 TABLET BY                                    TAKE 1          

 



     MOUTH EVERY MOUTH EVERY                                    TABLET BY       

    



     DAY  DAY                                     MOUTH           



                                                  EVERY DAY           

 

     calcium calcium           No                       calcium           [facility]



     1200mg 1200mg                                    1200mg           Medical

 

     diclofenac diclofenac           No                       diclofenac        

   [facility]



     1 % topical 1 % topical                                    1 %            M

edical



     gel APPLY 4 gel APPLY 4                                    topical         

  



     GRAMS TO GRAMS TO                                    gel APPLY           



     THE  THE                                     4 GRAMS TO           



     AFFECTED AFFECTED                                    THE            



     AREA(S) BY AREA(S) BY                                    AFFECTED          

 



     TOPICAL TOPICAL                                    AREA(S) BY           



     ROUTE 4 ROUTE 4                                    TOPICAL           



     TIMES PER TIMES PER                                    ROUTE 4           



     DAY  DAY                                     TIMES PER           



                                                  DAY            

 

     escitalopra escitalopra           No                       escitalopr      

     [facility]



     m 10 mg m 10 mg                                    am 10 mg           Medic

al



     tablet TAKE tablet TAKE                                    tablet          

 



     1 TABLET BY 1 TABLET BY                                    TAKE 1          

 



     MOUTH EVERY MOUTH EVERY                                    TABLET BY       

    



     DAY  DAY                                     MOUTH           



                                                  EVERY DAY           

 

     levothyroxi levothyroxi           No                       levothyrox      

     [facility]



     ne 150 mcg ne 150 mcg                                    ine 150           

Medical



     tablet TAKE tablet TAKE                                    mcg tablet      

     



     1 TABLET BY 1 TABLET BY                                    TAKE 1          

 



     MOUTH EVERY MOUTH EVERY                                    TABLET BY       

    



     OTHER DAY OTHER DAY                                    MOUTH           



     ALT WITH ALT WITH                                    EVERY           



     SYNTHROID SYNTHROID                                    OTHER DAY           



     175MCG 175MCG                                    ALT WITH           



                                                  SYNTHROID           



                                                  175MCG           

 

     losartan losartan           No                       losartan           [facility]

[facility]



     100 mg 100 mg                                    100 mg           Medical



     tablet TAKE tablet TAKE                                    tablet          

 



     1 TABLET BY 1 TABLET BY                                    TAKE 1          

 



     MOUTH EVERY MOUTH EVERY                                    TABLET BY       

    



     DAY  DAY                                     MOUTH           



                                                  EVERY DAY           

 

     metformin metformin           No                       metformin           

[facility]



      mg  mg                                     mg           

Medical



     tablet,exte tablet,exte                                    tablet,ext      

     



     nded nded                                    ended           



     release 24 release 24                                    release 24        

   



     hr TAKE 2 hr TAKE 2                                    hr TAKE 2           



     TABLETS BY TABLETS BY                                    TABLETS BY        

   



     MOUTH EVERY MOUTH EVERY                                    MOUTH           



     DAY AT DAY AT                                    EVERY DAY           



     BEDTIME BEDTIME                                    AT BEDTIME           

 

     nitrofurant nitrofurant           No                       nitrofuran      

     [facility]



     oin  oin                                     toin           [facility]



     monohydrate monohydrate                                    monohydrat      

     



     /macrocryst /macrocryst                                    e/macrocry      

     



     als 100 mg als 100 mg                                    stals 100         

  



     capsule 1 capsule 1                                    mg capsule          

 



     tablet tablet                                    1 tablet           



     after after                                    after           



     cystoscopy, cystoscopy,                                    cystoscopy      

     



     second second                                    , second           



     tablet 12 tablet 12                                    tablet 12           



     hours later hours later                                    hours           



                                                  later           

 

     nystatin nystatin           No                       nystatin           [facility]

via



     100,000 100,000                                    100,000           Medica

l



     unit/gram unit/gram                                    unit/gram           



     topical topical                                    topical           



     cream APPLY cream APPLY                                    cream           



     1    1                                       APPLY 1           



     APPLICATION APPLICATION                                    APPLICATIO      

     



     EXTERNALLY EXTERNALLY                                    N              



     TWICE A DAY TWICE A DAY                                    EXTERNALLY      

     



     FOR 7 DAYS FOR 7 DAYS                                    TWICE A           



                                                  DAY FOR 7           



                                                  DAYS           

 

     Ocuvite Ocuvite           No                       Ocuvite           [facility]



                                                                 [facility]

 

     rosuvastati rosuvastati           No                       rosuvastat      

     [facility]



     n 10 mg n 10 mg                                    in 10 mg           Medic

al



     tablet TAKE tablet TAKE                                    tablet          

 



     1 TABLET BY 1 TABLET BY                                    TAKE 1          

 



     MOUTH EVERY MOUTH EVERY                                    TABLET BY       

    



     DAY  DAY                                     MOUTH           



                                                  EVERY DAY           

 

     Trelegy Trelegy           No                       Trelegy           [facility]



     Ellipta 100 Ellipta 100                                    Ellipta         

  Medical



     mcg-62.5 mcg-62.5                                    100            



     mcg-25 mcg mcg-25 mcg                                    mcg-62.5          

 



     powder for powder for                                    mcg-25 mcg        

   



     inhalation inhalation                                    powder for        

   



     INHALE 1 INHALE 1                                    inhalation           



     PUFF BY PUFF BY                                    INHALE 1           



     MOUTH EVERY MOUTH EVERY                                    PUFF BY         

  



     DAY  DAY                                     MOUTH           



                                                  EVERY DAY           

 

     trospium ER trospium ER           No             1capsul Q1D  trospium     

      [facility]



     60 mg 60 mg                          e(s)      ER 60 mg           Medical



     capsule,ext capsule,ext                                    capsule,ex      

     



     ended ended                                    tended           



     release 24 release 24                                    release 24        

   



     hr Take 1 hr Take 1                                    hr Take 1           



     capsule capsule                                    capsule           



     every day every day                                    every day           



     by oral by oral                                    by oral           



     route as route as                                    route as           



     directed directed                                    directed           



     for 90 for 90                                    for 90           



     days. days.                                    days.           

 

     Vitamin D3 Vitamin D3           No                       Vitamin D3        

   [facility]



                                                                 [facility]

 

     zolpidem 10 zolpidem 10           No                       zolpidem        

   [facility]



     mg tablet mg tablet                                    10 mg           Medi

loren



     TAKE 1 TAKE 1                                    tablet           



     TABLET BY TABLET BY                                    TAKE 1           



     MOUTH EVERY MOUTH EVERY                                    TABLET BY       

    



     DAY AT DAY AT                                    MOUTH           



     BEDTIME AS BEDTIME AS                                    EVERY DAY         

  



     NEEDED NEEDED                                    AT BEDTIME           



                                                  AS NEEDED           







Vital Signs







             Vital Name   Observation Time Observation Value Comments     Source

 

             BP Diastolic 2022 00:00:00 100 mm[Hg]                [facility]

anny

 

             Height       2022 00:00:00 64 [in_i]                 [facility]

anny

 

             BMI (Body Mass Index) 2022 00:00:00 30.9 kg/m2               

 [facility]

 

             BP Systolic  2022 00:00:00 169 mm[Hg]                [facility]

edical

 

             Body Weight  2022 00:00:00 180 [lb_av]               [facility]

edical







Procedures







                Procedure       Date / Time     Performing Clinician Source



                                Performed                       

 

                [id number]         2021-10-18 00:00:00 [email or web address]        [facility]

 

                Orthopedic - Knee 2021 00:00:00                 [facility]

ical



                Replacement                                     

 

                Orthopedic - Knee 2014 00:00:00                 [facility]

ical



                Replacement                                     

 

                Hysterectomy - Vaginal 1978 00:00:00                 [facility]

[facility]







Plan of Care







             Planned Activity Planned Date Details      Comments     Source

 

             Diagnostic Test Pending 2022 00:00:00 [facility]inalysis,            

   [facility]



                                       complete [code =              



                                       urinalysis,               



                                       complete]                 

 

             Diagnostic Test Pending 2022 00:00:00 culture, urine         

     [facility]



                                       [code = culture,              



                                       urine]                    

 

             Future Appointment 2023 13:00:00 Stacey Collier, [id number]          

    [facility]



                                       [address]; [address],              



                                       [place], [place]               



                                       [address]                







Encounters







        Start   End     Encounter Admission Attending Care    Care    Encounter 

Source



        Date/Time Date/Time Type    Type    Clinicians Facility Department ID   

   

 

        2021-10-18         Inpatient         ROSALINDA YapTO   [id number]

 [facility]



        09:15:00                         Dilip                 [id number]  [place]



                                                                        [facility]



                                                                        d[facility]



                                                                        Hospita



                                                                        l

 

        2021         Inpatient         ROSALINDA YapTO   [id number]

 [facility]



        15:00:00                         Dilip                 [id number]  [place]



                                                                        [facility]



                                                                        dic



                                                                        Hospita



                                                                        l

 

        2021         Inpatient EL      ROSALINDA Yap   [facility]    [id number]

 [facility]



        13:00:00                         Dilip                 [id number]  [place]



                                                                        [facility]



                                                                        dic



                                                                        Hospita



                                                                        l

 

        2022 Outpatient         ELIZABETH PRIV    PRIV    151

[id number] [facility]



        00:00:00 00:00:00                 _Venice                 [id number] [facility]

l

 

        2022 Ginger                  PRIV    [facility]



        00:00:00 00:00:00 [facility]         Medic

al



                        ELIZABETH Millard         



                        MD: [address]                         _[address],                         Office*         



                        [address]                                           



                        [address],                                           



                        [place], [place]                                              



                        [address]                                         



                        , Ph.                                           



                        [phone]                                         

 

        2022 Outpatient         [facility] PRIV    PRIV    151

[id number] [facility]



        00:00:00 00:00:00                 _Venice                 [id number] Med[facility]

l

 

        2022-10-31 2022-10-31 Outpatient         [facility] PRIV    PRIV    151

[id number] [facility]



        00:00:00 00:00:00                 _Venice                 [id number] [facility]

l

 

        2022 Outpatient ANAND Singh [facility]   VALE    [id number]

[id number] [facility]



        08:00:00 08:00:00                 Chiquitaolayinka Hernandez      [facility]

 

        2021-10-18 2021-10-19 Inpatient ANAND Yap [facility]   SURG    [id number]

[id number] [facility]



        08:55:00 13:16:00                 Dilip                 85      [place]



                                                                        [facility]



                                                                        dic



                                                                        Hospita



                                                                        l

 

        2021 Outpatient ANAND Yap [facility]   3DAY    [id number]

[id number] [facility]



        09:00:00 23:00:00                 Dilip                 90      [place]



                                                                        [facility]



                                                                        dic



                                                                        Hospita



                                                                        l

 

        2021 Outpatient         Fracisco [facility]   LABO    [id number]

7969 [facility]



        18:17:00 18:17:00                 Dilip                 16      [facility]

 

        2021 Outpatient         Fracisco [facility]   REFE    [id number]

[id number] [facility]



        18:07:00 18:07:00                 Dilip                 49      [facility]

 

        2021 Outpatient EL      Elkouskyrie, [facility]   DAYS    [id number]

6-20 [facility]



        05:41:00 05:41:00                 Pablo                 [id number]  [place]



                                                                        [facility]



                                                                        dic



                                                                        Hospita



                                                                        l

 

        2021 Outpatient         Anandcjkyrie, [facility]   LABO    [id number]

[id number] [facility]



        18:16:00 18:16:00                 Pablo                 82      [facility]

 

        2021 Inpatient EL      Eljuan francisco, [facility]   SURG    [id number]

-20 [facility]



        14:40:00 14:40:00                 Pablo                 [id number]  [place]



                                                                        [facility]



                                                                        dic



                                                                        Hospita



                                                                        l

 

        2021 Outpatient RENEE [place]    [id number]

[id number] [facility]



        12:00:00 12:00:00                 Chiquita Mallory      [facility]

 

        2020 Outpatient RENEE [place]    [id number]

[id number] [facility]



        12:00:00 12:00:00                 Chiquita Kee      [facility]

 

        2018-10-12 2018-10-12 Outpatient         [id number]    PRIV    151

[id number] [facility]



        00:00:00 00:00:00                 _Cathey                 [id number] [facility]

l







Results







           Test Description Test Time  Test Comments Results    Result Comments 

Source









                    GLUBED              2021-10-19 08:28:00 









                      Test Item  Value      Reference Range Interpretation Comme

nts









             GLUBED (test code = GLUBED) 237 mg/dL           H            



BASIC METABOLIC PANEL2021-10-19 06:49:00





             Test Item    Value        Reference Range Interpretation Comments

 

             SODIUM (test code = 141 mmol/L   136-145      N            



             NA)                                                 

 

             POTASSIUM (test code = 4.9 mmol/L   3.5-5.1      N            



             K)                                                  

 

             CHLORIDE (test code = 104.0 mmol/L        N            



             CL)                                                 

 

             CARBON DIOXIDE (test 30.1 mmol/L  21-32        N            



             code = CO2)                                         

 

             GLUCOSE (test code = 174 mg/dL           H            



             GLU)                                                

 

             BLOOD UREA NITROGEN 19 mg/dL     7-18         H            



             (test code = BUN)                                        

 

             GLOMERULAR FILTRATION 74.9         >60                       Unit o

f measure:



             RATE (test code = GFR)                                        mL/mi

n/1.73



                                                                 [id number]



                                                                 Range:Healthy



                                                                 Adults >90



                                                                 mL/min/1.73 m2 

For



                                                                 Chronic Kidney



                                                                 Disease: Stage 

II



                                                                 Mild Decrease i

n



                                                                 GFR 60-90 Stage

 III



                                                                 Moderate Decrea

se



                                                                 in GFR 30-59 St

age



                                                                 IV Severe Decre

ase



                                                                 in GFR 15-29 St

age



                                                                 V Kidney Failur

e



                                                                 <15

 

             CREATININE (test code 0.75 mg/dL   0.55-1.30    N            



             = CREAT)                                            

 

             CALCIUM (test code = 8.2 mg/dL    8.2-10.1     N            



             CA)                                                 



HGB HCT2021-10-19 06:01:00





             Test Item    Value        Reference Range Interpretation Comments

 

             HEMOGLOBIN (test code = HGB) 11.3 g/dL    12-16        L           

 

 

             HEMATOCRIT (test code = HCT) 34.0 %       37-47        L           

 



SPECIMEN COMMENT: POD #1GLUBED2021-10-19 05:47:00





             Test Item    Value        Reference Range Interpretation Comments

 

             GLUBED (test code = GLUBED) 158 mg/dL           H            



GLUBED2021-10-18 20:08:00





             Test Item    Value        Reference Range Interpretation Comments

 

             GLUBED (test code = GLUBED) 232 mg/dL           H            



GLUBED2021-10-18 11:19:00





             Test Item    Value        Reference Range Interpretation Comments

 

             GLUBED (test code = GLUBED) 152 mg/dL           H            



GLUBED2021-10-18 09:24:00





             Test Item    Value        Reference Range Interpretation Comments

 

             GLUBED (test code = GLUBED) 149 mg/dL           H            



GLYCOSYLATED HEMOGLOBIN (HA1C)2021 20:30:00





             Test Item    Value        Reference Range Interpretation Comments

 

             GLYCOSYLATED 7.3 %        4.8-5.9      H            Any condition t

hat shortens



             HEMOGLOBIN (HA1C)                                        erythocyte

 survival or



             (test code = GLYHGB)                                        decreas

esmean erythrocyte



                                                                 age (e.g., jalyn

very from



                                                                 acute blood los

s,hemolytic



                                                                 anemai) will fa

lsely lower



                                                                 HGBA1c resultsr

egardless of



                                                                 the method used

. HGBA1c



                                                                 results frompat

ients with



                                                                 HbSS, HbCC and 

HbSc must be



                                                                 interpreted wit

hcaution



                                                                 given the patho

logical



                                                                 processes, incl

uding



                                                                 anemia,increase

d red cell



                                                                 turnover, trans

fusion



                                                                 requirements, t

hatadversely



                                                                 impact HGBA1c a

s a marker



                                                                 of long-term



                                                                 glycemiccontrol

.



                                                                 Alternative for

ms of



                                                                 testing such as



                                                                 fructosaminesho

uld be



                                                                 considered for 

these



                                                                 patients.Any co

ndition that



                                                                 shortens erytho

cyte



                                                                 survival or dec

reasesmean



                                                                 erythrocyte age

 (e.g.,



                                                                 recovery from a

cute blood



                                                                 loss,hemolytic 

anemia) will



                                                                 falsely lower H

GBA1c



                                                                 resultsregardle

ss of the



                                                                 method used. HG

BA1c results



                                                                 from patientswi

th HbSS,



                                                                 HbCC, and HbSc 

must be



                                                                 interpreted wit

h



                                                                 cautiongiven th

e



                                                                 pathological pr

ocesses,



                                                                 including anemi

a,increased



                                                                 red cell turnov

er,



                                                                 transfusion req

uirements,



                                                                 thatadversely i

mpact HGBA1c



                                                                 as a marker of 

long-term



                                                                 glycemiccontrol

.



                                                                 Alternative for

ms of



                                                                 testing such as



                                                                 fructosaminesho

uld be



                                                                 considered for 

these



                                                                 patients.DONE A

T: [facility]



                                                                 [id number]



                                                                 [place], 

[address]



GLYCOSYLATED HEMOGLOBIN (HA1C)2021 20:30:00





             Test Item    Value        Reference Range Interpretation Comments

 

             GLYCOSYLATED 7.3 %        4.8-5.9      H            Any condition t

hat shortens



             HEMOGLOBIN (HA1C)                                        erythocyte

 survival or



             (test code = GLYHGB)                                        decreas

esmean erythrocyte



                                                                 age (e.g., jalyn

very from



                                                                 acute blood los

s,hemolytic



                                                                 anemia) will fa

lsely lower



                                                                 HGBA1c resultsr

egardless of



                                                                 the method used

. HGBA1c



                                                                 results from [facility]



                                                                 HbSS, HbCC, and

 HbSc must



                                                                 be interpreted 

with



                                                                 cautiongiven th

e



                                                                 pathological pr

ocesses,



                                                                 including anemi

a,increased



                                                                 red cell turnov

er,



                                                                 transfusion req

uirements,



                                                                 thatadversely i

mpact HGBA1c



                                                                 as a marker of 

long-term



                                                                 glycemiccontrol

.



                                                                 Alternative for

ms of



                                                                 testing such as



                                                                 fructosaminesho

uld be



                                                                 considered for 

these



                                                                 patients.



PROTHROMBIN [id number] 20:04:00





             Test Item    Value        Reference Range Interpretation Comments

 

             PROTHROMBIN TIME 10.3 secs    10.1-12.5    N            



             PATIENT (test code =                                        



             PTP)                                                

 

             INTERNATIONAL NORMAL 0.90         <2.0                      RECOMME

NDED THERAPEUTIC



             RATIO (test code =                                        RANGE FOR

 ORAL



             INR)                                                ANTICOAGULANTTR

EATMENT:



                                                                 CONDITION INRPr

ophylaxis



                                                                 of venous throm

bosis in



                                                                 2.0 - 3.0 high-

risk



                                                                 medical or surg

ical



                                                                 patientsTreatme

nt of



                                                                 venous thrombos

is 2.0 -



                                                                 3.0Prevention o

f



                                                                 embolism 2.0 -



                                                                 3.0Prevention o

f



                                                                 recurrent embol

ism, or



                                                                 3.0 - 4.5 patie

nts with



                                                                 mechanical pros

thetic



                                                                 intravascular v

guy



IS PATIENT ON ANTICOAGULANTS ? NHas Lab been notified if Patient is on Heparin 
Drip? NOIf Yes, orderCBC, OCCULT BLOOD, PT every other day NTHROMBOPLASTIN TIME 
[id number] 20:04:00





             Test Item    Value        Reference Range Interpretation Comments

 

             PTT ACTIVATED (test code = APTT) 28.2 secs    24.9-37.0    N       

     



IS PATIENT ON ANTICOAGULANTS ? NHas Lab been notified if Patient is on Heparin 
Drip? NOIf Yes, orderCBC, OCCULT BLOOD, PT every other day NCOMPREHENSIVE 
METABOLIC [id number] 20:00:00





             Test Item    Value        Reference Range Interpretation Comments

 

             SODIUM (test code = 143 mmol/L   136-145      N            



             NA)                                                 

 

             POTASSIUM (test code = 4.1 mmol/L   3.5-5.1      N            



             K)                                                  

 

             CHLORIDE (test code = 103.0 mmol/L        N            



             CL)                                                 

 

             CARBON DIOXIDE (test 29.1 mmol/L  21-32        N            



             code = CO2)                                         

 

             GLUCOSE (test code = 153 mg/dL           H            



             GLU)                                                

 

             BLOOD UREA NITROGEN 19 mg/dL     7-18         H            



             (test code = BUN)                                        

 

             GLOMERULAR FILTRATION 86.8         >60                       Unit o

f measure:



             RATE (test code = GFR)                                        mL/mi

n/1.73



                                                                 [id number]



                                                                 Range:Healthy



                                                                 Adults >90



                                                                 mL/min/1.73 m2 

For



                                                                 Chronic Kidney



                                                                 Disease: Stage 

II



                                                                 Mild Decrease i

n



                                                                 GFR 60-90 Stage

 III



                                                                 Moderate Decrea

se



                                                                 in GFR 30-59 St

age



                                                                 IV Severe Decre

ase



                                                                 in GFR 15-29 St

age



                                                                 V Kidney Failur

e



                                                                 <15

 

             CREATININE (test code 0.66 mg/dL   0.55-1.30    N            



             = CREAT)                                            

 

             TOTAL PROTEIN (test 6.6 g/dL     6.4-8.2      N            



             code = PROT)                                        

 

             ALBUMIN (test code = 3.6 g/dL     3.4-5.0      N            



             ALB)                                                

 

             GLOBULIN (test code = 3.0 g/dL     2.2-4.2      N            



             GLOB)                                               

 

             ALBUMIN/GLOBULIN RATIO 1.2          0.7-2.0      N            



             (test code = A/G)                                        

 

             CALCIUM (test code = 8.9 mg/dL    8.2-10.1     N            



             CA)                                                 

 

             BILIRUBIN TOTAL (test 0.20 mg/dL   0.2-1.00     N            



             code = BILT)                                        

 

             SGOT/AST (test code = 20.0 U/L     15-37        N            



             AST)                                                

 

             SGPT/ALT (test code = 37.0 U/L     12-78        N            Please

 note new



             ALT)                                                normal range.

 

             ALKALINE PHOSPHATASE 65 U/L              N            



             TOTAL (test code =                                        



             ALKP)                                               



CBC W/AUTO [id number] 19:48:00





             Test Item    Value        Reference Range Interpretation Comments

 

             WHITE BLOOD CELL (test code = WBC) 8.3 K/mm3    5.8-11.0     N     

       

 

             RED BLOOD CELL (test code = RBC) 4.44 M/mm3   4.2-5.4      N       

     

 

             HEMOGLOBIN (test code = HGB) 13.9 g/dL    12-16        N           

 

 

             HEMATOCRIT (test code = HCT) 41.9 %       37-47        N           

 

 

             MEAN CELL VOLUME (test code = MCV) 94 fL        80-98        N     

       

 

             MEAN CELL HGB (test code = MCH) 31.3 pg      27-34        N        

    

 

             MEAN CELL HGB CONCENTRATION (test 33.2 g/dL    30.8-34.1    N      

      



             code = MCHC)                                        

 

             RED CELL DISTRIBUTION WIDTH (test 13.6 %       11-16        N      

      



             code = RDW)                                         

 

             PLT (test code = PLT) 348 K/mm3    130-400      N            

 

             MEAN PLATELET VOLUME (test code = 10.1 fL      8.9-12.1     N      

      



             MPV)                                                

 

             NEUTROPHIL % (test code = NT%) 63.0 %       45-70        N         

   

 

             LYMPHOCYTE % (test code = LY%) 25.3 %       20-40        N         

   

 

             MONOCYTE % (test code = MO%) 8.7 %        3-10         N           

 

 

             EOSINOPHIL % (test code = EO%) 2.2 %        1-5          N         

   

 

             BASOPHIL % (test code = BA%) 0.7 %        0.0-1.1      N           

 

 

             NEUTROPHIL # (test code = NT#) 5.20 K/mm3   2.00-7.50    N         

   

 

             LYMPHOCYTE # (test code = LY#) 2.09 K/mm3   1.50-4.00    N         

   

 

             MONOCYTE # (test code = MO#) 0.72 K/mm3   0.2-0.8      N           

 

 

             EOSINOPHIL # (test code = EO#) 0.18 K/mm3   0.04-0.4     N         

   

 

             BASOPHIL # (test code = BA#) 0.06 K/mm3   0.02-0.10    N           

 

 

             MANUAL DIFF REQUIRED (test code = NO           MANUAL DIFF         

      



             MDIFF)                                              

 

             NUCLEATED RED BLOOD CELL (test 0 %          0-0          N         

   



             code = NRBC)                                        



[id number] 10:46:00





             Test Item    Value        Reference Range Interpretation Comments

 

             GLUBED (test code = GLUBED) 121 mg/dL           N            



Novel Coronavirus  [id number] 10:07:00





             Test Item    Value        Reference Range Interpretation Comments

 

             Novel Coronavirus Negative     Negative                  Positive r

esults are



             2019 Inhouse (test                                        indicativ

e of the presence



             code = COVNONPUI)                                        ofSARS-CoV

-2 RNA, clinical



                                                                 correlation wit

h patient



                                                                 historyand othe

r



                                                                 diagnostic info

rmation is



                                                                 necessary to



                                                                 determinepatien

t infection



                                                                 status. Positiv

e results



                                                                 do not rule out

bacterial



                                                                 infection or co

-infection



                                                                 with other viru

ses.



                                                                 Negative result

s do not



                                                                 preclude SARS-C

oV-2



                                                                 infection andsh

ould not be



                                                                 used as the sol

e basis for



                                                                 patient



                                                                 managementdecis

ions.



                                                                 Negative result

s must be



                                                                 combined with



                                                                 otherclinical



                                                                 observations, p

atient



                                                                 history, and



                                                                 epidemiological

information



                                                                 . Detection of 

SARS-CoV-2



                                                                 RNA may be affe

cted



                                                                 bysample collec

tion



                                                                 methods, storag

e



                                                                 conditions, and

/or stageof



                                                                 infection. Clemencia

l RNA



                                                                 mutations, vacc

inations,



                                                                 antiviraltherap

eutics,



                                                                 antibiotics,



                                                                 chemotherapeuti

c



                                                                 orimmunosuppres

catracho drugs



                                                                 have not been e

valuated



                                                                 for effectson d

etection.



                                                                 Results are for

 the



                                                                 identification 

of



                                                                 SARS-CoV-2 RNA 

usingthe



                                                                 import.io  Sy

stem under



                                                                 the FDA Emergen

cy



                                                                 UseAuthorizatio

n. The



                                                                 testing is perf

ormed by



                                                                 personneltraine

d in the



                                                                 procedures for 

the [employer]



                                                                  molecular

diagnostic



                                                                 SARS-CoV-2 assa

y in vitro.



Novel Coronavirus  [id number] 10:07:00





             Test Item    Value        Reference Range Interpretation Comments

 

             Novel Coronavirus Negative     Negative                  Positive r

esults are



             2019 Inhouse (test                                        indicativ

e of the presence



             code = COVNONPUI)                                        ofSARS-CoV

-2 RNA, clinical



                                                                 correlation wit

h patient



                                                                 historyand othe

r



                                                                 diagnostic info

rmation is



                                                                 necessary to



                                                                 determinepatien

t infection



                                                                 status. Positiv

e results



                                                                 do not rule out

bacterial



                                                                 infection or co

-infection



                                                                 with other viru

ses.



                                                                 Negative result

s do not



                                                                 preclude SARS-C

oV-2



                                                                 infection andsh

ould not be



                                                                 used as the sol

e basis for



                                                                 patient



                                                                 managementdecis

ions.



                                                                 Negative result

s must be



                                                                 combined with



                                                                 otherclinical



                                                                 observations, p

atient



                                                                 history, and



                                                                 epidemiological

information



                                                                 . Detection of 

SARS-CoV-2



                                                                 RNA may be affe

cted



                                                                 bysample collec

tion



                                                                 methods, storag

e



                                                                 conditions, and

/or stageof



                                                                 infection. Clemencia

l RNA



                                                                 mutations, vacc

inations,



                                                                 antiviraltherap

eutics,



                                                                 antibiotics,



                                                                 chemotherapeuti

c



                                                                 orimmunosuppres

catracho drugs



                                                                 have not been e

valuated



                                                                 for effectson d

etection.



                                                                 Results are for

 the



                                                                 identification 

of



                                                                 SARS-CoV-2 RNA 

usingthe



                                                                 [place]

stem under



                                                                 the FDA Emergen

cy



                                                                 UseAuthorizatio

n. The



                                                                 testing is perf

ormed by



                                                                 personneltraine

d in the



                                                                 procedures for 

the [employer]



                                                                  molecular

diagnostic



                                                                 SARS-CoV-2 kip mittal in vitro.



- MRI LW JNT W/O CONT [id number] 15:34:00
********************************************************[facility] : 1945  Sex: 
F************************************************************Patient Name: 
FRANTZ SALMON Unit No: [id number] EXAMS:  CPT CODE: [id number] MRI LW JNT
W/[place] [address] MRI OF THE RIGHT KNEE DIAGNOSIS: 1. Horizontal tear through 
the tibial articular surfaceof the body and posterior horn of the medial 
meniscus with extension into the posterior root and partial extrusion of the 
body of the meniscus. 2. Horizontal tear of the anterior horn of the lateral men
iscus near the meniscal root. 3. Chondromalacia of the patella with partial-
thickness cartilage lossand subchondral cyst formation. A moderate to large 
joint effusion is present without evidence for aloose body. COMMENT: COMPARISON:
No prior exams available. Scans were performed in the sagittal, axial and 
coronal planes utilizing T1, spin density with fat saturation and T2-weighted 
pulse sequences.Bony and hyaline cartilage abnormalities are present as 
described. The body and posterior horn of the medial meniscus and the anterior 
horn the lateral meniscus are torn. The anterior horn of the medial meniscus and
the posterior horn of the lateral meniscus are within normal limits in 
appearance. No abnormality is seen involving the anterior or posterior cruciate 
or medial or lateral collateral  ligaments. The quadriceps and patellar tendons 
are intact. ** Electronically Signed by Kody Higuera MD ** ** on 2021 
at 1534 **  Reported and signed by: Kody Higuera MD CC: Dilip Yap MD
 Technologist: Ermelinda Huddleston RT(R) Transcribed D/T: 2021 () Kevin.JCL 
[facility] NAME: FRANTZ SALMON [address] [facility] PHYS: 
Dilip Patel : 1945 AGE: 75 SEX: F [place] [address] ACCT NO: [id number] LOC: Y.MRI PHONE #: [phone] EXAM DATE:
2021 STATUS: REG CLI FAX #: [phone]  RAD #: D/C DT PAGE 1 Signed 
Report Patient Name: FRANTZ SALMON Unit No: [id number]  EXAMS: CPT CODE:
[id number] MRI LW JNT W/[place] [address] (Continued) Orig Print D/T: S: 2021
(1538) [facility] NAME: FRANTZ SALMON [address] [facility] 
PHYS: Dilip Patel : 1945 AGE: 75 SEX: F [place] [address] ACCT NO: [id number] LOC: Y.MRI PHONE #: [phone] EXAM DATE: 
2021 STATUS: REG CLI FAX #: [phone] RAD #: D/C DT PAGE 2  Signed 
Report- MRI UP JNT W/O CONT [id number] 14:11:00 Patient Name: FRANTZ SALMON Unit No: [id number] EXAMS: CPT CODE: [id number] MRI UP JNT W/O CONT RT  
[address] MR of the right shoulder without contrast TECHNIQUE: Paracoronal, 
parasagittal and axial multisequence images obtained.  COMPARISON: None 
available. FINDINGS: Acromioclavicular joint: Moderate degenerative changes. 
Rotator cuff: High-grade tear of the anterior supraspinatus insertion measures 1
cm transversely, likely reaching full-thickness. Minimal tendon retraction is 
present. There is tendinosis and interstitial tear of the posterior 
supraspinatus and anterior infraspinatus tendons.Moderate atrophy of the teres 
minor muscle. Long head biceps tendon: The biceps tendon is medially subluxated 
with superimposed tendinosis. Labrum: No evidence of labral tear. Cartilage/ 
bone: No full thickness chondral defect. No acute fracture. No suspicious 
osseous lesion.  Other: No joint effusionpresent. IMPRESSION: 1. High-grade tear
the anterior supraspinatus tendon, likely full-thickness with minimal tendon 
retraction. 2. Moderate teres minor muscle atrophy. 3. Subluxation and 
tendinosis ofthe long head of the biceps tendon. ** Electronically Signed by 
DEBBY Slade ** ** on 2019 at 1411 ** Reported and signed by: 
Collin Slade M.D. CC: Oscar Mccauley MD  Technologist: WILIAM Garcia(R) Transcribed D/T: 2019 (8758) [facility] NAME: FRANTZ SALMON [address] [facility] PHYS: Oscar Cartagena : 1945 AGE: 74 SEX: F [place] [address] ACCT 
NO: [id number] LOC: Y.MRI PHONE #: [phone] EXAM DATE: 2019 STATUS: 
DEP CLI FAX #: [phone] RAD #: D/C DT PAGE 1 Signed Report Patient Name: FRANTZ TORO Unit No: [id number] EXAMS: CPT CODE: [id number] MRI UP JNT W/O
CONT RT [address]  (Continued) Orig Print D/T: S: 2019 (1414) [facility] NAME: FRANTZ SALMON[address] [facility] PHYS: Oscar Cartagena : 1945 AGE: 74  SEX: F [place] [address] ACCT
NO: [id number] LOC: Y.MRI PHONE #: [phone] EXAM DATE: 2019 STATUS: 
DEP CLI FAX #: [phone]  RAD #: D/C DT PAGE 2 Signed Report

## 2022-12-04 NOTE — RAD REPORT
EXAM DESCRIPTION:  US - CP - 12/4/2022 2:38 pm

 

CLINICAL HISTORY:  cva

 

COMPARISON:  Neck Angio dated 12/4/2022

 

TECHNIQUE:  Real-time sonographic evaluation of bilateral carotid and vertebral systems was performed
. Gray scale and Doppler interrogation were performed with waveform tracing bilaterally.

 

FINDINGS:  Normal high resistance waveforms are noted in both external carotid arteries. The common c
arotid arteries and internal carotid arteries show normal low resistance waveforms.

 

Calcified plaquing changes are present in each carotid bulb. Visually there is no significant degree 
of luminal narrowing. No dissection findings Peak systolic and end diastolic velocity values and the 
ICA/CCA ratios are in the non-hemodynamically significant range.

 

Antegrade flow seen in both vertebral arteries.

 

Velocity values and ratios were recorded and are retained in the patient's imaging records.

 

 

IMPRESSION:  Bilateral carotid bulb calcified plaquing changes.

 

Plaquing changes do not cause hemodynamically significant degrees of stenosis.
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
## 2022-12-05 VITALS — DIASTOLIC BLOOD PRESSURE: 66 MMHG | SYSTOLIC BLOOD PRESSURE: 138 MMHG

## 2022-12-05 VITALS — TEMPERATURE: 97.1 F

## 2022-12-05 LAB
ALBUMIN SERPL BCP-MCNC: 3.1 G/DL (ref 3.4–5)
ALP SERPL-CCNC: 67 U/L (ref 45–117)
ALT SERPL W P-5'-P-CCNC: 40 U/L (ref 12–78)
AST SERPL W P-5'-P-CCNC: 19 U/L (ref 15–37)
BUN BLD-MCNC: 16 MG/DL (ref 7–18)
GLUCOSE SERPLBLD-MCNC: 180 MG/DL (ref 74–106)
HCT VFR BLD CALC: 37.5 % (ref 36–45)
HDLC SERPL-MCNC: 49 MG/DL (ref 40–60)
INR BLD: 0.91
LDLC SERPL CALC-MCNC: 40 MG/DL (ref ?–130)
LYMPHOCYTES # SPEC AUTO: 2 K/UL (ref 0.7–4.9)
MAGNESIUM SERPL-MCNC: 1.8 MG/DL (ref 1.8–2.4)
MCV RBC: 91.5 FL (ref 80–100)
PMV BLD: 7.4 FL (ref 7.6–11.3)
POTASSIUM SERPL-SCNC: 3.7 MMOL/L (ref 3.5–5.1)
RBC # BLD: 4.09 M/UL (ref 3.86–4.86)

## 2022-12-05 RX ADMIN — LOSARTAN POTASSIUM SCH MG: 50 TABLET, FILM COATED ORAL at 09:41

## 2022-12-05 RX ADMIN — Medication SCH ML: at 09:00

## 2022-12-05 RX ADMIN — AMLODIPINE BESYLATE SCH MG: 2.5 TABLET ORAL at 09:42
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
## 2022-12-05 NOTE — RAD REPORT
EXAM DESCRIPTION:  MRI - Brain Wo Cont - 12/5/2022 8:03 am

 

CLINICAL HISTORY:  CVA/vertigo

 

COMPARISON:  Head angio dated 12/4/2022; Neck Angio dated 12/4/2022; Ct Stroke Brain Wo Cont dated 12
/4/2022

 

TECHNIQUE:  Sagittal T1-weighted images were obtained along with axial PD, heavily T2-weighted and T2
-FLAIR images. Axial DWI and ADC mapping sequences were also obtained along with coronal heavily T2-w
eighted images.

 

FINDINGS:  No intracranial hemorrhage, mass or acute infarction. There is no edema or shift of midlin
e structures. No extra-axial fluid collections. Gray-matter/white matter junction is preserved. Signa
l voids are seen as a normal finding in the major intracranial vessels. Patient has very little atrop
hy with ventricles in proportion to any volume loss that may be present. Cerebral white matter shows 
mild chronic ischemic change. This spares the thalamus and basal ganglia tissues. There are questiona
ble trace amounts of chronic ischemic change in the brainstem.

 

No globe or orbital content abnormality.

 

Mastoid air cells and paranasal sinuses are clear.

 

 

IMPRESSION:  No acute or subacute infarction. No acute intracranial finding.

 

Patient has mild chronic ischemic change in the cerebral white matter and possibly very minimal brain
stem chronic ischemic change.

## 2022-12-05 NOTE — EKG
Test Date:    2022-12-04               Test Time:    10:47:38

Technician:   LUIS ANTONIO                                     

                                                     

MEASUREMENT RESULTS:                                       

Intervals:                                           

Rate:         76                                     

NM:           170                                    

QRSD:         94                                     

QT:           374                                    

QTc:          420                                    

Axis:                                                

P:            57                                     

NM:           170                                    

QRS:          -63                                    

T:            31                                     

                                                     

INTERPRETIVE STATEMENTS:                                       

                                                     

Normal sinus rhythm

Left anterior fascicular block

Abnormal ECG



Electronically Signed On 12-05-22 13:45:55 CST by Duane Haider

## 2022-12-05 NOTE — ECHO
HEIGHT: 5 ft 4 in   WEIGHT: 180 lb 0.119 oz   DATE OF STUDY: 12/05/2022   REFER DR: Darinel Bills MD

2-DIMENSIONAL: YES

     M.MODE: YES

 DOPPLER: YES

COLOR FLOW: YES



                    TDS:  

PORTABLE: YES

 DEFINITY:  

BUBBLE STUDY: 





DIAGNOSIS:  STROKE



CARDIAC HISTORY:  

CATHERIZATION: NO

SURGERY: NO

PROSTHETIC VALVE: NO

PACEMAKER: NO





MEASUREMENTS (cm)

    DIASTOLIC (NORMALS)                 SYSTOLIC (NORMALS)

IVSd                 1.0 (0.6-1.2)                    LA Diam 3.4 (1.9-4.0)     LVEF       
  69%  

LVIDd               4.3 (3.5-5.7)                        LVIDs      2.7 (2.0-3.5)     %FS  
        39%

LVPWd             1.2 (0.6-1.2)

Ao Diam           2.4 (2.0-3.7)



2 DIMENSIONAL ASSESSMENT:

RIGHT ATRIUM:                   NORMAL

LEFT ATRIUM:       NORMAL



RIGHT VENTRICLE:            NORMAL

LEFT VENTRICLE: NORMAL



TRICUSPID VALVE:             NORMAL

MITRAL VALVE:     MITRAL ANNULAR CALCIFICATION (MILD)



PULMONIC VALVE:             NORMAL

AORTIC VALVE:     NORMAL



PERICARDIAL EFFUSION: NONE

AORTIC ROOT:      NORMAL





LEFT VENTRICULAR WALL MOTION:     NORMAL



DOPPLER/COLOR FLOW:     MILD MITRAL REGURGITATION, MILD TRICUSPID REGURGITATION



COMMENTS:      

  1. NORMAL LEFT VENTRICULAR EJECTION FRACTION 60-65% WITH NORMAL WALL MOTION

  2. MITRAL ANNULAR CALCIFICATION WITH MILD MITRAL REGURGITATION

  3. MILD TRICUSPID REGURGITATION

  4. GRADE I DIASTOLIC  DYSFUNCTION



TECHNOLOGIST:   KARSON MILLS